# Patient Record
Sex: FEMALE | Race: WHITE | NOT HISPANIC OR LATINO | Employment: OTHER | URBAN - METROPOLITAN AREA
[De-identification: names, ages, dates, MRNs, and addresses within clinical notes are randomized per-mention and may not be internally consistent; named-entity substitution may affect disease eponyms.]

---

## 2022-01-01 ENCOUNTER — APPOINTMENT (OUTPATIENT)
Dept: RADIOLOGY | Facility: MEDICAL CENTER | Age: 72
End: 2022-01-01
Attending: EMERGENCY MEDICINE
Payer: MEDICARE

## 2022-01-01 ENCOUNTER — HOSPITAL ENCOUNTER (OUTPATIENT)
Facility: MEDICAL CENTER | Age: 72
End: 2022-07-25
Attending: EMERGENCY MEDICINE | Admitting: HOSPITALIST
Payer: MEDICARE

## 2022-01-01 VITALS
HEIGHT: 62 IN | TEMPERATURE: 97.3 F | OXYGEN SATURATION: 99 % | DIASTOLIC BLOOD PRESSURE: 67 MMHG | HEART RATE: 128 BPM | SYSTOLIC BLOOD PRESSURE: 148 MMHG | WEIGHT: 119.05 LBS | RESPIRATION RATE: 12 BRPM | BODY MASS INDEX: 21.91 KG/M2

## 2022-01-01 DIAGNOSIS — I10 HYPERTENSION, UNSPECIFIED TYPE: ICD-10-CM

## 2022-01-01 DIAGNOSIS — I62.9 INTRACRANIAL HEMORRHAGE (HCC): ICD-10-CM

## 2022-01-01 DIAGNOSIS — I63.9 ACUTE CVA (CEREBROVASCULAR ACCIDENT) (HCC): ICD-10-CM

## 2022-01-01 LAB
ABO + RH BLD: NORMAL
ABO GROUP BLD: NORMAL
ALBUMIN SERPL BCP-MCNC: 4.1 G/DL (ref 3.2–4.9)
ALBUMIN/GLOB SERPL: 1.5 G/DL
ALP SERPL-CCNC: 89 U/L (ref 30–99)
ALT SERPL-CCNC: 9 U/L (ref 2–50)
ANION GAP SERPL CALC-SCNC: 16 MMOL/L (ref 7–16)
APTT PPP: 28.6 SEC (ref 24.7–36)
AST SERPL-CCNC: 19 U/L (ref 12–45)
BASOPHILS # BLD AUTO: 0.6 % (ref 0–1.8)
BASOPHILS # BLD: 0.08 K/UL (ref 0–0.12)
BILIRUB SERPL-MCNC: 1.1 MG/DL (ref 0.1–1.5)
BLD GP AB SCN SERPL QL: NORMAL
BUN SERPL-MCNC: 16 MG/DL (ref 8–22)
CALCIUM SERPL-MCNC: 9.4 MG/DL (ref 8.5–10.5)
CHLORIDE SERPL-SCNC: 105 MMOL/L (ref 96–112)
CO2 SERPL-SCNC: 20 MMOL/L (ref 20–33)
CREAT SERPL-MCNC: 0.69 MG/DL (ref 0.5–1.4)
EKG IMPRESSION: NORMAL
EOSINOPHIL # BLD AUTO: 0.13 K/UL (ref 0–0.51)
EOSINOPHIL NFR BLD: 1 % (ref 0–6.9)
ERYTHROCYTE [DISTWIDTH] IN BLOOD BY AUTOMATED COUNT: 45.6 FL (ref 35.9–50)
GFR SERPLBLD CREATININE-BSD FMLA CKD-EPI: 91 ML/MIN/1.73 M 2
GLOBULIN SER CALC-MCNC: 2.8 G/DL (ref 1.9–3.5)
GLUCOSE SERPL-MCNC: 154 MG/DL (ref 65–99)
HCT VFR BLD AUTO: 48.8 % (ref 37–47)
HGB BLD-MCNC: 15.7 G/DL (ref 12–16)
IMM GRANULOCYTES # BLD AUTO: 0.05 K/UL (ref 0–0.11)
IMM GRANULOCYTES NFR BLD AUTO: 0.4 % (ref 0–0.9)
INR PPP: 1.04 (ref 0.87–1.13)
LYMPHOCYTES # BLD AUTO: 1.4 K/UL (ref 1–4.8)
LYMPHOCYTES NFR BLD: 10.6 % (ref 22–41)
MCH RBC QN AUTO: 29 PG (ref 27–33)
MCHC RBC AUTO-ENTMCNC: 32.2 G/DL (ref 33.6–35)
MCV RBC AUTO: 90.2 FL (ref 81.4–97.8)
MONOCYTES # BLD AUTO: 0.51 K/UL (ref 0–0.85)
MONOCYTES NFR BLD AUTO: 3.9 % (ref 0–13.4)
NEUTROPHILS # BLD AUTO: 11.07 K/UL (ref 2–7.15)
NEUTROPHILS NFR BLD: 83.5 % (ref 44–72)
NRBC # BLD AUTO: 0 K/UL
NRBC BLD-RTO: 0 /100 WBC
PLATELET # BLD AUTO: 273 K/UL (ref 164–446)
PMV BLD AUTO: 10.5 FL (ref 9–12.9)
POTASSIUM SERPL-SCNC: 3.9 MMOL/L (ref 3.6–5.5)
PROT SERPL-MCNC: 6.9 G/DL (ref 6–8.2)
PROTHROMBIN TIME: 13.5 SEC (ref 12–14.6)
RBC # BLD AUTO: 5.41 M/UL (ref 4.2–5.4)
RH BLD: NORMAL
SODIUM SERPL-SCNC: 141 MMOL/L (ref 135–145)
TROPONIN T SERPL-MCNC: 24 NG/L (ref 6–19)
WBC # BLD AUTO: 13.2 K/UL (ref 4.8–10.8)

## 2022-01-01 PROCEDURE — 85730 THROMBOPLASTIN TIME PARTIAL: CPT

## 2022-01-01 PROCEDURE — G0378 HOSPITAL OBSERVATION PER HR: HCPCS

## 2022-01-01 PROCEDURE — 80053 COMPREHEN METABOLIC PANEL: CPT

## 2022-01-01 PROCEDURE — 700111 HCHG RX REV CODE 636 W/ 250 OVERRIDE (IP): Performed by: HOSPITALIST

## 2022-01-01 PROCEDURE — 96376 TX/PRO/DX INJ SAME DRUG ADON: CPT

## 2022-01-01 PROCEDURE — 85025 COMPLETE CBC W/AUTO DIFF WBC: CPT

## 2022-01-01 PROCEDURE — 99225 PR SUBSEQUENT OBSERVATION CARE,LEVEL II: CPT | Performed by: STUDENT IN AN ORGANIZED HEALTH CARE EDUCATION/TRAINING PROGRAM

## 2022-01-01 PROCEDURE — 86900 BLOOD TYPING SEROLOGIC ABO: CPT

## 2022-01-01 PROCEDURE — 93005 ELECTROCARDIOGRAM TRACING: CPT | Performed by: EMERGENCY MEDICINE

## 2022-01-01 PROCEDURE — 99219 PR INITIAL OBSERVATION CARE,LEVL II: CPT | Performed by: HOSPITALIST

## 2022-01-01 PROCEDURE — 96375 TX/PRO/DX INJ NEW DRUG ADDON: CPT

## 2022-01-01 PROCEDURE — 96376 TX/PRO/DX INJ SAME DRUG ADON: CPT | Mod: XU

## 2022-01-01 PROCEDURE — 86850 RBC ANTIBODY SCREEN: CPT

## 2022-01-01 PROCEDURE — 700117 HCHG RX CONTRAST REV CODE 255: Performed by: EMERGENCY MEDICINE

## 2022-01-01 PROCEDURE — 700111 HCHG RX REV CODE 636 W/ 250 OVERRIDE (IP)

## 2022-01-01 PROCEDURE — 99224 PR SUBSEQUENT OBSERVATION CARE,LEVEL I: CPT | Performed by: STUDENT IN AN ORGANIZED HEALTH CARE EDUCATION/TRAINING PROGRAM

## 2022-01-01 PROCEDURE — 94760 N-INVAS EAR/PLS OXIMETRY 1: CPT

## 2022-01-01 PROCEDURE — A9270 NON-COVERED ITEM OR SERVICE: HCPCS | Performed by: EMERGENCY MEDICINE

## 2022-01-01 PROCEDURE — 86901 BLOOD TYPING SEROLOGIC RH(D): CPT

## 2022-01-01 PROCEDURE — 700102 HCHG RX REV CODE 250 W/ 637 OVERRIDE(OP): Performed by: EMERGENCY MEDICINE

## 2022-01-01 PROCEDURE — 70450 CT HEAD/BRAIN W/O DYE: CPT | Mod: MG

## 2022-01-01 PROCEDURE — 70498 CT ANGIOGRAPHY NECK: CPT | Mod: MG

## 2022-01-01 PROCEDURE — 96374 THER/PROPH/DIAG INJ IV PUSH: CPT | Mod: XU

## 2022-01-01 PROCEDURE — 84484 ASSAY OF TROPONIN QUANT: CPT

## 2022-01-01 PROCEDURE — 36415 COLL VENOUS BLD VENIPUNCTURE: CPT

## 2022-01-01 PROCEDURE — 99285 EMERGENCY DEPT VISIT HI MDM: CPT

## 2022-01-01 PROCEDURE — 85610 PROTHROMBIN TIME: CPT

## 2022-01-01 PROCEDURE — 700111 HCHG RX REV CODE 636 W/ 250 OVERRIDE (IP): Performed by: STUDENT IN AN ORGANIZED HEALTH CARE EDUCATION/TRAINING PROGRAM

## 2022-01-01 PROCEDURE — 96375 TX/PRO/DX INJ NEW DRUG ADDON: CPT | Mod: XU

## 2022-01-01 PROCEDURE — 70496 CT ANGIOGRAPHY HEAD: CPT | Mod: MG

## 2022-01-01 RX ORDER — ATROPINE SULFATE 10 MG/ML
2 SOLUTION/ DROPS OPHTHALMIC EVERY 4 HOURS PRN
Status: DISCONTINUED | OUTPATIENT
Start: 2022-01-01 | End: 2022-01-01 | Stop reason: HOSPADM

## 2022-01-01 RX ORDER — HYDRALAZINE HYDROCHLORIDE 20 MG/ML
10 INJECTION INTRAMUSCULAR; INTRAVENOUS
Status: DISCONTINUED | OUTPATIENT
Start: 2022-01-01 | End: 2022-01-01

## 2022-01-01 RX ORDER — DOCUSATE SODIUM 100 MG/1
100 CAPSULE, LIQUID FILLED ORAL EVERY 12 HOURS
Status: DISCONTINUED | OUTPATIENT
Start: 2022-01-01 | End: 2022-01-01 | Stop reason: HOSPADM

## 2022-01-01 RX ORDER — CLONIDINE 0.2 MG/24H
1 PATCH, EXTENDED RELEASE TRANSDERMAL ONCE
Status: DISCONTINUED | OUTPATIENT
Start: 2022-01-01 | End: 2022-01-01 | Stop reason: HOSPADM

## 2022-01-01 RX ORDER — DOCUSATE SODIUM 100 MG/1
100 CAPSULE, LIQUID FILLED ORAL 2 TIMES DAILY
COMMUNITY

## 2022-01-01 RX ORDER — POLYVINYL ALCOHOL 14 MG/ML
2 SOLUTION/ DROPS OPHTHALMIC EVERY 6 HOURS PRN
Status: DISCONTINUED | OUTPATIENT
Start: 2022-01-01 | End: 2022-01-01 | Stop reason: HOSPADM

## 2022-01-01 RX ORDER — LORAZEPAM 2 MG/ML
2 INJECTION INTRAMUSCULAR
Status: DISCONTINUED | OUTPATIENT
Start: 2022-01-01 | End: 2022-01-01

## 2022-01-01 RX ORDER — LATANOPROST 50 UG/ML
1 SOLUTION/ DROPS OPHTHALMIC NIGHTLY
COMMUNITY

## 2022-01-01 RX ORDER — ONDANSETRON 2 MG/ML
4 INJECTION INTRAMUSCULAR; INTRAVENOUS EVERY 4 HOURS PRN
Status: DISCONTINUED | OUTPATIENT
Start: 2022-01-01 | End: 2022-01-01 | Stop reason: HOSPADM

## 2022-01-01 RX ORDER — MORPHINE SULFATE 4 MG/ML
INJECTION INTRAVENOUS
Status: COMPLETED
Start: 2022-01-01 | End: 2022-01-01

## 2022-01-01 RX ORDER — DULOXETIN HYDROCHLORIDE 30 MG/1
30 CAPSULE, DELAYED RELEASE ORAL EVERY MORNING
COMMUNITY

## 2022-01-01 RX ORDER — LIDOCAINE 50 MG/G
1 PATCH TOPICAL EVERY 24 HOURS
COMMUNITY

## 2022-01-01 RX ORDER — HALOPERIDOL 2 MG/ML
1 SOLUTION ORAL EVERY 6 HOURS PRN
Status: DISCONTINUED | OUTPATIENT
Start: 2022-01-01 | End: 2022-01-01

## 2022-01-01 RX ORDER — ASPIRIN 325 MG
325 TABLET ORAL EVERY MORNING
COMMUNITY

## 2022-01-01 RX ORDER — BISACODYL 10 MG
10 SUPPOSITORY, RECTAL RECTAL
COMMUNITY

## 2022-01-01 RX ORDER — LEVOTHYROXINE SODIUM 0.07 MG/1
75 TABLET ORAL EVERY MORNING
COMMUNITY

## 2022-01-01 RX ORDER — DIAZEPAM 5 MG/ML
5 INJECTION, SOLUTION INTRAMUSCULAR; INTRAVENOUS
Status: DISCONTINUED | OUTPATIENT
Start: 2022-01-01 | End: 2022-01-01 | Stop reason: HOSPADM

## 2022-01-01 RX ORDER — MORPHINE SULFATE 4 MG/ML
2 INJECTION INTRAVENOUS ONCE
Status: COMPLETED | OUTPATIENT
Start: 2022-01-01 | End: 2022-01-01

## 2022-01-01 RX ORDER — IBUPROFEN 200 MG
400 TABLET ORAL EVERY 4 HOURS PRN
COMMUNITY

## 2022-01-01 RX ORDER — HALOPERIDOL 5 MG/ML
1 INJECTION INTRAMUSCULAR EVERY 6 HOURS PRN
Status: DISCONTINUED | OUTPATIENT
Start: 2022-01-01 | End: 2022-01-01

## 2022-01-01 RX ADMIN — MORPHINE SULFATE 5 MG: 10 INJECTION INTRAVENOUS at 02:48

## 2022-01-01 RX ADMIN — MORPHINE SULFATE 10 MG: 10 INJECTION INTRAVENOUS at 17:49

## 2022-01-01 RX ADMIN — MORPHINE SULFATE 5 MG: 10 INJECTION INTRAVENOUS at 23:08

## 2022-01-01 RX ADMIN — MORPHINE SULFATE 2 MG: 4 INJECTION INTRAVENOUS at 11:19

## 2022-01-01 RX ADMIN — MORPHINE SULFATE 10 MG: 10 INJECTION INTRAVENOUS at 06:44

## 2022-01-01 RX ADMIN — MORPHINE SULFATE 5 MG: 10 INJECTION INTRAVENOUS at 07:36

## 2022-01-01 RX ADMIN — MORPHINE SULFATE 5 MG: 10 INJECTION INTRAVENOUS at 09:45

## 2022-01-01 RX ADMIN — MORPHINE SULFATE 5 MG: 10 INJECTION INTRAVENOUS at 18:40

## 2022-01-01 RX ADMIN — CLONIDINE 1 PATCH: 0.2 PATCH TRANSDERMAL at 10:16

## 2022-01-01 RX ADMIN — MORPHINE SULFATE 10 MG: 10 INJECTION INTRAVENOUS at 21:17

## 2022-01-01 RX ADMIN — MORPHINE SULFATE 5 MG: 10 INJECTION INTRAVENOUS at 07:43

## 2022-01-01 RX ADMIN — MORPHINE SULFATE 5 MG: 10 INJECTION INTRAVENOUS at 22:53

## 2022-01-01 RX ADMIN — MORPHINE SULFATE 10 MG: 10 INJECTION INTRAVENOUS at 06:03

## 2022-01-01 RX ADMIN — MORPHINE SULFATE 10 MG: 10 INJECTION INTRAVENOUS at 14:24

## 2022-01-01 RX ADMIN — ONDANSETRON HYDROCHLORIDE 4 MG: 2 INJECTION, SOLUTION INTRAMUSCULAR; INTRAVENOUS at 12:44

## 2022-01-01 RX ADMIN — MORPHINE SULFATE 10 MG: 10 INJECTION INTRAVENOUS at 19:46

## 2022-01-01 RX ADMIN — MORPHINE SULFATE 5 MG: 10 INJECTION INTRAVENOUS at 01:20

## 2022-01-01 RX ADMIN — DIAZEPAM 5 MG: 5 INJECTION, SOLUTION INTRAMUSCULAR; INTRAVENOUS at 06:32

## 2022-01-01 RX ADMIN — IOHEXOL 80 ML: 350 INJECTION, SOLUTION INTRAVENOUS at 09:51

## 2022-01-01 RX ADMIN — MORPHINE SULFATE 10 MG: 10 INJECTION INTRAVENOUS at 15:18

## 2022-01-01 RX ADMIN — MORPHINE SULFATE 10 MG: 10 INJECTION INTRAVENOUS at 05:52

## 2022-01-01 RX ADMIN — MORPHINE SULFATE 10 MG: 10 INJECTION INTRAVENOUS at 12:22

## 2022-01-01 RX ADMIN — HALOPERIDOL LACTATE 1 MG: 5 INJECTION, SOLUTION INTRAMUSCULAR at 11:49

## 2022-01-01 RX ADMIN — MORPHINE SULFATE 10 MG: 10 INJECTION INTRAVENOUS at 03:13

## 2022-01-01 RX ADMIN — MORPHINE SULFATE 5 MG: 10 INJECTION INTRAVENOUS at 04:19

## 2022-01-01 RX ADMIN — MORPHINE SULFATE 10 MG: 10 INJECTION INTRAVENOUS at 14:26

## 2022-01-01 RX ADMIN — MORPHINE SULFATE 5 MG: 10 INJECTION INTRAVENOUS at 12:44

## 2022-01-01 ASSESSMENT — PATIENT HEALTH QUESTIONNAIRE - PHQ9
SUM OF ALL RESPONSES TO PHQ9 QUESTIONS 1 AND 2: 0
2. FEELING DOWN, DEPRESSED, IRRITABLE, OR HOPELESS: NOT AT ALL
1. LITTLE INTEREST OR PLEASURE IN DOING THINGS: NOT AT ALL

## 2022-01-01 ASSESSMENT — FIBROSIS 4 INDEX: FIB4 SCORE: 1.72

## 2022-07-20 PROBLEM — I62.9 INTRACRANIAL HEMORRHAGE (HCC): Status: ACTIVE | Noted: 2022-01-01

## 2022-07-20 PROBLEM — I16.1 HYPERTENSIVE EMERGENCY: Status: ACTIVE | Noted: 2022-01-01

## 2022-07-20 NOTE — ED NOTES
sats 72-90% on room air intermittently, irregular slow breaths. No grimacing or nonverbal signs of distress.

## 2022-07-20 NOTE — ED NOTES
Report received from Janay SHARMA.  PT is non-verbal, GCS=10, refusing to open eyes.  PEr report pt has vivid h/o CVA since 2018.  Notable contractions to B LES and L UE.    PEr report CT scan showed new ICH.  PT remaisn hypertensive -200.    Catapress patch found on pt's back.  Removed and new order received from ERP.      ERP able to discuss plan of care and CT scan finding with pt's decision maker, and VO for comfort measures given.      CXRay canceled per VO.

## 2022-07-20 NOTE — H&P
Hospital Medicine History & Physical Note    Date of Service  7/20/2022    Primary Care Physician  Roberto Dempsey M.D. (Inactive)    Consultants  neurology        Code Status  Comfort Care/DNR    Chief Complaint  Chief Complaint   Patient presents with   • Possible Stroke     Pt presets from long term care facility after staff reported pt has a new unilateral facial droop. Pt withdrawn as well. Pt has a history of CVA, is non-verbal and with left sided deficits at baseline but is usually more alert per staff. Pt is a hayden of the [facility], unable to contact at this time, pt is also a DNR/DNI. Stroke protocol initiated. FSBG 154.       History of Presenting Illness  Bee Sharma is a 72 y.o. female reported to the [facility] with past medical history of CVA and severely disabled who presented 7/20/2022 with altered mental status.  She was noted to be hypertensive with SBP up to 240s on presentation.  CT head revealed pontine/midbrain/basal ganglia hemorrhage with associated intraventricular hemorrhage.  ERP discussed with patient's legal guardian and she was transitioned to comfort care.    I discussed the plan of care with bedside RN and ERP.    Review of Systems  Review of Systems   Unable to perform ROS: Patient unresponsive       Past Medical History   has a past medical history of Aphasia, Elevated cholesterol, Hypertension, Psychiatric disorder, Skull fracture (HCC), and Stroke (HCC).    Surgical History   has a past surgical history that includes other neurological surg; peg placement (9/12/08); and hip cannulated screw (4/13/2010).     Family History  family history is not on file.   Family history reviewed with patient. There is no family history that is pertinent to the chief complaint.     Social History   reports that she has never smoked. She does not have any smokeless tobacco history on file. She reports that she does not drink alcohol and does not use drugs.    Allergies  Allergies   Allergen Reactions   •  Cyclobenzaprine Unspecified     On MAR from facility     • Hydrocodone Unspecified     On MAR from facility     • Penicillins Unspecified     Tolerated Ceftriaxone in 2010  On MAR from facility     • Sulfa Drugs Unspecified     On MAR from facility     • Tramadol Unspecified     On MAR from facility         Medications  Prior to Admission Medications   Prescriptions Last Dose Informant Patient Reported? Taking?   DULoxetine (CYMBALTA) 30 MG Cap DR Particles 7/19/2022 at 0800 MAR from Other Facility Yes Yes   Sig: Take 30 mg by mouth every morning.   aspirin (ASA) 325 MG Tab 7/19/2022 at 0800 MAR from Other Facility Yes Yes   Sig: Take 325 mg by mouth every morning.   bisacodyl (DULCOLAX) 10 MG Suppos 7/17/2022 at 0148 MAR from Other Facility Yes Yes   Sig: Insert 10 mg into the rectum 1 time a day as needed. Indications: Constipation   clonidine (CATAPRES) 0.2 MG/24HR PATCH WEEKLY 7/14/2022 at 800 MAR from Other Facility Yes No   Sig: Apply 1 Patch to skin as directed every 7 days. Changed ever Wednesday   docusate sodium (COLACE) 100 MG Cap 7/19/2022 at 2100 MAR from Other Facility Yes Yes   Sig: Take 100 mg by mouth 2 times a day.   ibuprofen (MOTRIN) 200 MG Tab 7/18/2022 at 0846 MAR from Other Facility Yes Yes   Sig: Take 400 mg by mouth every four hours as needed.   latanoprost (XALATAN) 0.005 % Solution 7/19/2022 at 2100 MAR from Other Facility Yes Yes   Sig: Administer 1 Drop into both eyes every evening.   levothyroxine (SYNTHROID) 75 MCG Tab 7/20/2022 at 0500 MAR from Other Facility Yes Yes   Sig: Take 75 mcg by mouth every morning.   lidocaine (LIDODERM) 5 % Patch 7/20/2022 at 0500 MAR from Other Facility Yes Yes   Sig: Place 1 Patch on the skin every 24 hours.   magnesium hydroxide (MILK OF MAGNESIA) 400 MG/5ML Suspension 7/16/2022 at 1653 MAR from Other Facility Yes Yes   Sig: Take 30 mL by mouth 1 time a day as needed (constipation).   polyethylene glycol/lytes (MIRALAX) Pack 7/20/2022 at 0800 MAR from  Other Facility Yes No   Sig: Take 17 g by mouth every day. Indications: Constipation   simvastatin (ZOCOR) 40 MG Tab 7/19/2022 at 2100 MAR from Other Facility No No   Sig: Take 1 Tab by mouth every evening.   therapeutic multivitamin-minerals (THERAGRAN-M) Tab 7/19/2022 at 0800 MAR from Other Facility Yes No   Sig: Take 1 Tab by mouth every day.      Facility-Administered Medications: None       Physical Exam  Temp:  [36.9 °C (98.4 °F)] 36.9 °C (98.4 °F)  Pulse:  [] 108  Resp:  [10-28] 10  BP: (161-240)/() 161/71  SpO2:  [80 %-97 %] 80 %  Blood Pressure : (!) 161/71   Temperature: 36.9 °C (98.4 °F)   Pulse: (!) 108   Respiration: (!) 8   Pulse Oximetry: (!) 80 %       Physical Exam  Vitals and nursing note reviewed.   Constitutional:       Comments: obtunded   Cardiovascular:      Rate and Rhythm: Normal rate and regular rhythm.   Musculoskeletal:      Comments: Contractures of extremities   Neurological:      Comments: unresponsive         Laboratory:  Recent Labs     07/20/22  0925   WBC 13.2*   RBC 5.41*   HEMOGLOBIN 15.7   HEMATOCRIT 48.8*   MCV 90.2   MCH 29.0   MCHC 32.2*   RDW 45.6   PLATELETCT 273   MPV 10.5     Recent Labs     07/20/22  0925   SODIUM 141   POTASSIUM 3.9   CHLORIDE 105   CO2 20   GLUCOSE 154*   BUN 16   CREATININE 0.69   CALCIUM 9.4     Recent Labs     07/20/22  0925   ALTSGPT 9   ASTSGOT 19   ALKPHOSPHAT 89   TBILIRUBIN 1.1   GLUCOSE 154*     Recent Labs     07/20/22  0925   APTT 28.6   INR 1.04     No results for input(s): NTPROBNP in the last 72 hours.      Recent Labs     07/20/22  0925   TROPONINT 24*       Imaging:  CT-CTA NECK WITH & W/O-POST PROCESSING   Final Result      1.  At the level of the left carotid bifurcation there is an 11 x 8 mm area of abnormal contrast opacification medial to the internal carotid artery which may represent a pseudoaneurysm. Carotid duplex ultrasound could be performed for further    evaluation.   2.  Atherosclerosis.      Findings were  discussed with Dr. Alford on 7/20/2022 at 1136 hours.      CT-CTA HEAD WITH & W/O-POST PROCESS   Final Result      CT angiogram of the [place] of [place] within normal limits.      CT-HEAD W/O   Final Result      1.  2.5 x 3.0 cm right basal ganglia and parenchymal hematoma which extends into the ventricular system resulting in a small amount of dependent hemorrhage in the left lateral ventricle and within the third ventricle.   2.  Minimal right to left midline shift measuring 2 mm.   3.  Advanced diffuse cerebral substance loss.   4.  Advanced microangiopathic ischemic change.      Findings were discussed with Dr. Alford on 7/20/2022 0945 hours.          EKG:  I have personally reviewed the images and compared with prior images.    Assessment/Plan:  Justification for Admission Status  I anticipate this patient is appropriate for observation status at this time because as she is comfort care on admit and may pass within the next 48 hours    Patient will need a Med/Surg bed on MEDICAL service .  The need is secondary to requirement of comfort care measure medication administration.    * Intracranial hemorrhage (HCC)- (present on admission)  Assessment & Plan  Noted on CT imaging  Pt made comfort care    Hypertensive emergency  Assessment & Plan  With resultant ICH  Now on comfort care      VTE prophylaxis:  none- comfort care

## 2022-07-20 NOTE — ED NOTES
Pharmacy Medication Reconciliation    ~Med rec updated and complete per MAR-[facility]  ~Allergies have been verified and updated per MAR  ~No oral ABX within the last 30 days

## 2022-07-20 NOTE — ED PROVIDER NOTES
ED Provider Note    Scribed for Huseyin Alford M.D. by Moraima Wren. 7/20/2022,  9:27 AM.    Means of Arrival: Med Flight  History obtained from: EMS  History limited by: Patient is obtunded    CHIEF COMPLAINT  Chief Complaint   Patient presents with   • Possible Stroke     Pt presets from [facility] after staff reported pt has a new unilateral facial droop. Pt withdrawn as well. Pt has a history of CVA, is non-verbal and with left sided deficits at baseline but is usually more alert per staff. Pt is a hayden of the [facility], unable to contact at this time, pt is also a DNR/DNI. Stroke protocol initiated. FSBG 154.     HPI  Bee Sharma is a 72 y.o. female who presents to the Emergency Department via Med Flight from [facility] for evaluation of possible stroke onset prior to arrival. Per EMS report, she was noted by caregivers to have new left sided facial droop. EMS report she has complex history of multiple previous strokes and is nonverbal, GCS 11 at baseline, but typically more alert per staff. Per triage note, patient is a hayden of the [facility] with DNR/DNI in place. EMS note history of bilateral facial droop, bilateral contractures, dysphagia, hypertension, and hyperlipidemia. EMS report she takes daily aspirin, but does not take any other blood thinners. No alleviating factors were reported.      History limited by: Patient is obtunded    REVIEW OF SYSTEMS  CONSTITUTIONAL:  No fever. Obtunded.  NEUROLOGIC:  Nonverbal, GCS 11 at baseline. Positive left sided facial droop.  See HPI for further details.     ROS limited by: Patient is obtunded    PAST MEDICAL HISTORY  Past Medical History:   Diagnosis Date   • Aphasia    • Elevated cholesterol    • Hypertension    • Psychiatric disorder     depression   • Skull fracture (HCC)    • Stroke (HCC)      FAMILY HISTORY  None reported.    SOCIAL HISTORY   reports that she has never smoked. She does not have any smokeless tobacco history  on file. She reports that she does not drink alcohol and does not use drugs.    SURGICAL HISTORY  Past Surgical History:   Procedure Laterality Date   • HIP CANNULATED SCREW  4/13/2010    Performed by BRAYAN BURTON at SURGERY [facility] ORS   • PEG PLACEMENT  9/12/08    Performed by ORIANA MCCOY at ENDOSCOPY [facility] ORS   • OTHER NEUROLOGICAL SURG      left sided CVA     CURRENT MEDICATIONS  Current Outpatient Medications   Medication Instructions   • acetaminophen (TYLENOL) 650 mg, Oral, EVERY 6 HOURS PRN   • acetaminophen-codeine #3 (TYLENOL #3) 300-30 MG Tab 1 Tablet, Oral, EVERY 6 HOURS PRN   • amLODIPine (NORVASC) 10 mg, Oral, DAILY   • aspirin (ASA) 325 mg, Oral, DAILY   • clonidine (CATAPRES) 0.2 MG/24HR PATCH WEEKLY 1 Patch, Transdermal, EVERY 7 DAYS, Changed ever Wednesday    • heparin 5,000 Units, Subcutaneous, EVERY 8 HOURS   • hydrALAZINE (APRESOLINE) 25 mg, Oral, DAILY   • LORazepam (ATIVAN) 0.5 mg, Oral, EVERY 4 HOURS PRN   • NON SPECIFIED 1 Drop, Both Eyes, EVERY BEDTIME, Latanoprost Solution 0.005%<BR>Instill 1 drop in both eyes at bedtime related to Unspecified Glaucoma    • nystatin (MYCOSTATIN) Powder 1 Film, Topical, 3 TIMES DAILY   • oxyCODONE immediate-release (ROXICODONE) 5 mg, Oral, EVERY 3 HOURS PRN   • polyethylene glycol/lytes (MIRALAX) 17 g, Oral, DAILY   • sertraline (ZOLOFT) 25 mg, Oral, DAILY   • simvastatin (ZOCOR) 40 mg, Oral, EVERY EVENING   • spironolactone (ALDACTONE) 25 mg, Oral, DAILY   • therapeutic multivitamin-minerals (THERAGRAN-M) Tab 1 Tablet, Oral, DAILY     ALLERGIES  Allergies   Allergen Reactions   • Cyclobenzaprine Unspecified     On MAR from facility     • Hydrocodone Unspecified     On MAR from facility     • Penicillins Unspecified     Tolerated Ceftriaxone in 2010  On MAR from facility     • Sulfa Drugs Unspecified     On MAR from facility     • Tramadol Unspecified     On MAR from facility         PHYSICAL EXAM  VITAL SIGNS: Temp 36.9 °C (98.4 °F)  "(Temporal)   Ht 1.575 m (5' 2\")   Wt 51.7 kg (114 lb)   BMI 20.85 kg/m²    Gen: Obtunded.   HEENT: ATNC  Eyes: Right pupil 6 mm, left 4 mm, normal conjunctiva.   Neck: trachea midline  Resp: course upper breath sounds, snoring respiration  CV: Tachycardic. No JVD  Abd: non-distended, soft  Ext: No deformities  Psych: unable to assess  Neuro: baseline nonverbal. NIH score 29.    PE limited by: Patient is obtunded    DIAGNOSTIC STUDIES / PROCEDURES     EKG  Results for orders placed or performed during the hospital encounter of 22   EKG (NOW)   Result Value Ref Range    Report       [facility] Emergency Dept.    Test Date:  2022  Pt Name:    TAVO ZAMORANO                  Department: ER  MRN:        [id number]                      Room:        10  Gender:     Female                       Technician: [id number]  :        [id number]                   Requested By:MICHEL ALFORD  Order #:    [id number]                    Reading MD: Michel Alford    Measurements  Intervals                                Axis  Rate:       135                          P:          0  MA:         79                           QRS:        115  QRSD:       94                           T:          -69  QT:         304  QTc:        456    Interpretive Statements  SINUS TACHYCARDIA  RIGHT AXIS DEVIATION  PROBABLE INFERIOR INFARCT, AGE INDETERMINATE  ST DEPRESSION, CONSIDER ISCHEMIA, ANT-LAT LDS  ARTIFACT IN LEAD(S) I,II,III,aVR,aVL,V2,V3,V4,V5,V6 AND BASELINE WANDER IN  LEAD(S) I,II,III,aVL,aVF,V4  Compared to ECG 10/04/2008 12:29:47  Right-axis deviation now present  Myocardial  infarct finding now present  ST (T wave) deviation now present  Sinus rhythm no longer present  Electronically Signed On 2022 9:53:38 PDT by Michel Alford        LABS  Labs Reviewed   CBC WITH DIFFERENTIAL - Abnormal; Notable for the following components:       Result Value    WBC 13.2 (*)     RBC 5.41 (*)     Hematocrit 48.8 (*)     MCHC 32.2 " (*)     Neutrophils-Polys 83.50 (*)     Lymphocytes 10.60 (*)     Neutrophils (Absolute) 11.07 (*)     All other components within normal limits    Narrative:     Indicate which anticoagulants the patient is on:->UNKNOWN   COMP METABOLIC PANEL - Abnormal; Notable for the following components:    Glucose 154 (*)     All other components within normal limits    Narrative:     Indicate which anticoagulants the patient is on:->UNKNOWN   TROPONIN - Abnormal; Notable for the following components:    Troponin T 24 (*)     All other components within normal limits    Narrative:     Indicate which anticoagulants the patient is on:->UNKNOWN   PROTHROMBIN TIME    Narrative:     Indicate which anticoagulants the patient is on:->UNKNOWN   APTT    Narrative:     Indicate which anticoagulants the patient is on:->UNKNOWN   COD (ADULT)   ESTIMATED GFR    Narrative:     Indicate which anticoagulants the patient is on:->UNKNOWN   ABO RH CONFIRM     All labs reviewed by me.    RADIOLOGY  CT-CTA HEAD WITH & W/O-POST PROCESS   Final Result      CT angiogram of the [place] of [place] within normal limits.      CT-HEAD W/O   Final Result      1.  2.5 x 3.0 cm right basal ganglia and parenchymal hematoma which extends into the ventricular system resulting in a small amount of dependent hemorrhage in the left lateral ventricle and within the third ventricle.   2.  Minimal right to left midline shift measuring 2 mm.   3.  Advanced diffuse cerebral substance loss.   4.  Advanced microangiopathic ischemic change.      Findings were discussed with Dr. Alford on 7/20/2022 0945 hours.      CT-CTA NECK WITH & W/O-POST PROCESSING    (Results Pending)     The radiologist’s interpretation of all radiology studies have been reviewed by me.    COURSE & MEDICAL DECISION MAKING  Pertinent Labs & Imaging studies reviewed. (See chart for details)    9:27 AM Patient seen and examined at charge desk. Ordered for EKG, labs and imaging to evaluate.  Neurology  assessed patient at charge desk. Patient is a hayden of the state, but unable to contact at this time.    9:47 AM I discussed the patient's case and the above findings with Radiology who inform me the patient has a hematoma into ventricles.    9:56 AM I discussed the patient's case and the above findings with Sasha ([place] Guardian) who agrees to comfort care.    10:02 AM - Paged Hospitalist.     10:32 AM I discussed the patient's case and the above findings with Dr. Garner (Hospitalist) who will assess the patient for hospitalization.     NIH score: 29    CRITICAL CARE  The very real possibilty of a deterioration of this patient's condition required the highest level of my preparedness for sudden, emergent intervention.  I provided critical care services, which included medication orders, frequent reevaluations of the patient's condition and response to treatment, ordering and reviewing test results, and discussing the case with various consultants.  The critical care time associated with the care of the patient was 35 minutes. Review chart for interventions. This time is exclusive of any other billable procedures.       Medical Decision Making:  Patient arrives from a nursing facility with altered mental status.  At presentation, he has asymmetric pupils, appears to be somewhat posturing, possible seizure versus CVA.  Reportedly hypotensive by EMS.  Code stroke was activated and the patient went emergently for CAT scan.  Neurology PA at bedside for initial evaluation.  Patient is outside of the window for tPA.    CAT scan demonstrates pontine bleed with some extension into the ventricles.  Patient initially ordered for blood pressure management with nicardipine drip.  No blood thinners for reversal.  She had an old clonidine patch which was due for replacement, will replace this to prevent rebound hypertension.     I was able to get a hold of the power of , the deputy guardian, and after discussion with the  case, including various treatment options, we all agreed that comfort measures is in the best interest of the patient and in keeping with her prior wishes.  Patient will be admitted for comfort measures.      I wore a mask and eye protection throughout the encounter    DISPOSITION:  Patient will be hospitalized by Dr. Garner in critical condition.      FINAL IMPRESSION  1. Acute CVA (cerebrovascular accident) (HCC)    2. Hypertension, unspecified type    3.      The critical care time associated with the care of the patient was 35 minutes.      Moraima DRIVER (Scribceli), am scribing for, and in the presence of, Huseyin Alford M.D..    Electronically signed by: Moraima Wren (Baileyibceli), 7/20/2022    Huseyin DRIVER M.D. personally performed the services described in this documentation, as scribed by Moraima Wren in my presence, and it is both accurate and complete.    The note accurately reflects work and decisions made by me.  Huseyin Alford M.D.  7/20/2022  10:36 AM      This dictation was created using voice recognition software. The accuracy of the dictation is limited to the abilities of the software. I expect there may be some errors of grammar and possibly content. The nursing notes were reviewed and certain aspects of this information were incorporated into this note.

## 2022-07-20 NOTE — ED TRIAGE NOTES
"Chief Complaint   Patient presents with   • Possible Stroke     Pt presets from long term care facility after staff reported pt has a new unilateral facial droop. Pt withdrawn as well. Pt has a history of CVA, is non-verbal and with left sided deficits at baseline but is usually more alert per staff. Pt is a hayden of the state, unable to contact at this time, pt is also a DNR/DNI. Stroke protocol initiated. FSBG 154.     Temp 36.9 °C (98.4 °F) (Temporal)   Ht 1.575 m (5' 2\")   Wt 51.7 kg (114 lb)   BMI 20.85 kg/m²         "

## 2022-07-20 NOTE — PROGRESS NOTES
Neurology Note-- Stroke Alert    72-year old female with PMhx significant for stroke; severely disabled at baseline (GCS 11; aphasia, contractures x 4; is also reportedly a hayden of the state) and resides in a nursing home, also with hypothyroid and dyslipidemia (further details are limited) who presented to [facility] on 7/20/22 for a chief complaint of altered mentation. Nursing home staff last saw the patient in her usual state of health last night sometime/time is unknown. This morning, they found the patient obtunded, non interactive, question of facial weakness. EMS called and patient brought to [facility]. Here, SBP up to 240s. Stat CT head revealed pontine/midbrain/basal ganglia hemorrhage with associated IVH; likely hypertensive etiology. Her legal guardian was contacted from the ED and informed of the situation, and the decision was made to pursue comfort care measures only.     Neurology will thus sign off; please call with further questions/concerns.     CHETAN Sullivan.

## 2022-07-20 NOTE — ED NOTES
Report received from Rosalina SHARMA & ZACHARY Mcgovern. Pt has eyes closed, resps even & unlabored. Right pupil fixed & dilated, Left fixed & 3mm. No grimacing noted, but pt grunting. Morphine given.

## 2022-07-20 NOTE — ED NOTES
Report given to receiving RN, Pt is not tele, & floor RN unable to transport pt at this time. Placed on transport list.

## 2022-07-20 NOTE — ED NOTES
Alerted Hospitalist Patsy of signs of discomfort for pt, including hypertension and tachycardia and grimacing.  VO for pain medication received.

## 2022-07-21 NOTE — CARE PLAN
The patient is Unstable - High likelihood or risk of patient condition declining or worsening    Shift Goals  Clinical Goals: comfort and rest  Patient Goals: comfort and rest    Progress made toward(s) clinical / shift goals:      Problem: Pain - Standard  Goal: Alleviation of pain or a reduction in pain to the patient’s comfort goal  Outcome: Progressing     Problem: Fall Risk  Goal: Patient will remain free from falls  Outcome: Progressing    Patient is not progressing towards the following goals:    Problem: Knowledge Deficit - Standard  Goal: Patient and family/care givers will demonstrate understanding of plan of care, disease process/condition, diagnostic tests and medications  Outcome: Not Progressing

## 2022-07-21 NOTE — CARE PLAN
Problem: Pain - Standard  Goal: Alleviation of pain or a reduction in pain to the patient’s comfort goal  Outcome: Progressing     Problem: Skin Integrity  Goal: Skin integrity is maintained or improved  Outcome: Progressing     Problem: Fall Risk  Goal: Patient will remain free from falls  Outcome: Progressing     The patient is Unstable - High likelihood or risk of patient condition declining or worsening    Shift Goals  Clinical Goals: comfort rest  Patient Goals: comfort, rest    Progress made toward(s) clinical / shift goals:  patient is comfort care     Patient is not progressing towards the following goals:

## 2022-07-21 NOTE — PROGRESS NOTES
Assumed care from Scar VILLAGOMEZ  Assessment complete. Patient is laying in bed with bed alarm on. Patient is breathing slowly with around 2-4 breaths per minute. Comfort measures in place. Will continue to do hourly monitoring.  Patient resting comfortably in bed. Oral care preformed with mouth swabs, chap stick, and a warm wash cloth to the face. Patient responds to stimuli. O2 saturation in the 70's and breaths are around 5 breaths per minute.

## 2022-07-21 NOTE — PROGRESS NOTES
4 Eyes Skin Assessment Completed by ZACHARY Abbott and ZACHARY Parnell.    Head WDL  Ears WDL  Nose WDL  Mouth WDL  Neck WDL  Breast/Chest WDL  Shoulder Blades WDL  Spine WDL  (R) Arm/Elbow/Hand Bruising and Swelling  (L) Arm/Elbow/Hand Bruising  Abdomen WDL  Groin WDL  Scrotum/Coccyx/Buttocks WDL  (R) Leg WDL  (L) Leg WDL  (R) Heel/Foot/Toe Bruising and DTI to heel  (L) Heel/Foot/Toe WDL          Devices In Places Pulse Ox      Interventions In Place Pillows, Q2 Turns and Pressure Redistribution Mattress    Possible Skin Injury No    Pictures Uploaded Into Epic Yes  Wound Consult Placed N/A  RN Wound Prevention Protocol Ordered No

## 2022-07-21 NOTE — PROGRESS NOTES
Hospital Medicine Daily Progress Note    Date of Service  7/21/2022    Chief Complaint  Bee Sharma is a 72 y.o. female admitted 7/20/2022 with AMS    Hospital Course  Bee Sharma is a 72 y.o. female reported to the state with past medical history of CVA and severely disabled who presented 7/20/2022 with altered mental status.  She was noted to be hypertensive with SBP up to 240s on presentation.  CT head revealed pontine/midbrain/basal ganglia hemorrhage with associated intraventricular hemorrhage.  ERP disussed with patient's legal guardian and she was transitioned to comfort care.    Interval Problem Update  No acute events overnight.  Continue comfort care measures.  Anticipate patient passing away later today.    I have discussed this patient's plan of care and discharge plan at IDT rounds today with Case Management, Nursing, Nursing leadership, and other members of the IDT team.    Consultants/Specialty  none    Code Status  Comfort Care/DNR    Disposition  Patient is not medically cleared for discharge.   Anticipate patient to pass away in the hospital.    Review of Systems  Review of Systems   Unable to perform ROS: Acuity of condition        Physical Exam  Temp:  [36 °C (96.8 °F)-37.1 °C (98.7 °F)] 37.1 °C (98.7 °F)  Pulse:  [] 106  Resp:  [3-28] 3  BP: (109-240)/() 135/62  SpO2:  [8 %-97 %] 8 %    Physical Exam    Fluids  No intake or output data in the 24 hours ending 07/21/22 1015    Laboratory  Recent Labs     07/20/22  0925   WBC 13.2*   RBC 5.41*   HEMOGLOBIN 15.7   HEMATOCRIT 48.8*   MCV 90.2   MCH 29.0   MCHC 32.2*   RDW 45.6   PLATELETCT 273   MPV 10.5     Recent Labs     07/20/22  0925   SODIUM 141   POTASSIUM 3.9   CHLORIDE 105   CO2 20   GLUCOSE 154*   BUN 16   CREATININE 0.69   CALCIUM 9.4     Recent Labs     07/20/22  0925   APTT 28.6   INR 1.04               Imaging  CT-CTA NECK WITH & W/O-POST PROCESSING   Final Result      1.  At the level of the left carotid bifurcation there  is an 11 x 8 mm area of abnormal contrast opacification medial to the internal carotid artery which may represent a pseudoaneurysm. Carotid duplex ultrasound could be performed for further    evaluation.   2.  Atherosclerosis.      Findings were discussed with Dr. Alford on 7/20/2022 at 1136 hours.      CT-CTA HEAD WITH & W/O-POST PROCESS   Final Result      CT angiogram of the [place] within normal limits.      CT-HEAD W/O   Final Result      1.  2.5 x 3.0 cm right basal ganglia and parenchymal hematoma which extends into the ventricular system resulting in a small amount of dependent hemorrhage in the left lateral ventricle and within the third ventricle.   2.  Minimal right to left midline shift measuring 2 mm.   3.  Advanced diffuse cerebral substance loss.   4.  Advanced microangiopathic ischemic change.      Findings were discussed with Dr. lAford on 7/20/2022 0945 hours.           Assessment/Plan  * Intracranial hemorrhage (HCC)- (present on admission)  Assessment & Plan  Noted on CT imaging  Pt made comfort care    Hypertensive emergency  Assessment & Plan  With resultant ICH  Now on comfort care         VTE prophylaxis: comfort care    I have performed a physical exam and reviewed and updated ROS and Plan today (7/21/2022). In review of yesterday's note (7/20/2022), there are no changes except as documented above.

## 2022-07-22 NOTE — PROGRESS NOTES
Hospital Medicine Daily Progress Note    Date of Service  7/22/2022    Chief Complaint  Bee Sharma is a 72 y.o. female admitted 7/20/2022 with AMS    Hospital Course  Bee Sharma is a 72 y.o. female reported to the state with past medical history of CVA and severely disabled who presented 7/20/2022 with altered mental status.  She was noted to be hypertensive with SBP up to 240s on presentation.  CT head revealed pontine/midbrain/basal ganglia hemorrhage with associated intraventricular hemorrhage.  ERP disussed with patient's legal guardian and she was transitioned to comfort care.    Interval Problem Update  No acute events overnight.  Continue comfort care measures.  Anticipate patient passing away in next 24 hours.    I have discussed this patient's plan of care and discharge plan at IDT rounds today with Case Management, Nursing, Nursing leadership, and other members of the IDT team.    Consultants/Specialty  none    Code Status  Comfort Care/DNR    Disposition  Patient is not medically cleared for discharge.   Anticipate patient to pass away in the hospital.    Review of Systems  Review of Systems   Unable to perform ROS: Acuity of condition        Physical Exam  Temp:  [36.3 °C (97.3 °F)-36.3 °C (97.4 °F)] 36.3 °C (97.3 °F)  Pulse:  [] 116  Resp:  [6] 6  BP: (195-212)/(87-88) 212/88  SpO2:  [52 %-75 %] 52 %    Physical Exam    Fluids  No intake or output data in the 24 hours ending 07/22/22 0950    Laboratory  Recent Labs     07/20/22  0925   WBC 13.2*   RBC 5.41*   HEMOGLOBIN 15.7   HEMATOCRIT 48.8*   MCV 90.2   MCH 29.0   MCHC 32.2*   RDW 45.6   PLATELETCT 273   MPV 10.5     Recent Labs     07/20/22  0925   SODIUM 141   POTASSIUM 3.9   CHLORIDE 105   CO2 20   GLUCOSE 154*   BUN 16   CREATININE 0.69   CALCIUM 9.4     Recent Labs     07/20/22  0925   APTT 28.6   INR 1.04               Imaging  CT-CTA NECK WITH & W/O-POST PROCESSING   Final Result      1.  At the level of the left carotid bifurcation  there is an 11 x 8 mm area of abnormal contrast opacification medial to the internal carotid artery which may represent a pseudoaneurysm. Carotid duplex ultrasound could be performed for further    evaluation.   2.  Atherosclerosis.      Findings were discussed with Dr. Alford on 7/20/2022 at 1136 hours.      CT-CTA HEAD WITH & W/O-POST PROCESS   Final Result      CT angiogram of the [place] within normal limits.      CT-HEAD W/O   Final Result      1.  2.5 x 3.0 cm right basal ganglia and parenchymal hematoma which extends into the ventricular system resulting in a small amount of dependent hemorrhage in the left lateral ventricle and within the third ventricle.   2.  Minimal right to left midline shift measuring 2 mm.   3.  Advanced diffuse cerebral substance loss.   4.  Advanced microangiopathic ischemic change.      Findings were discussed with Dr. Alford on 7/20/2022 0945 hours.           Assessment/Plan  * Intracranial hemorrhage (HCC)- (present on admission)  Assessment & Plan  Noted on CT imaging  Pt made comfort care    Hypertensive emergency  Assessment & Plan  With resultant ICH  Now on comfort care       VTE prophylaxis: comfort care    I have performed a physical exam and reviewed and updated ROS and Plan today (7/22/2022). In review of yesterday's note (7/21/2022), there are no changes except as documented above.

## 2022-07-22 NOTE — CARE PLAN
The patient is Stable - Low risk of patient condition declining or worsening    Shift Goals  Clinical Goals: comfort care  Patient Goals: comfort care    Progress made toward(s) clinical / shift goals:      Problem: Pain - Standard  Goal: Alleviation of pain or a reduction in pain to the patient’s comfort goal  Outcome: Progressing     Problem: Skin Integrity  Goal: Skin integrity is maintained or improved  Outcome: Progressing     Problem: Fall Risk  Goal: Patient will remain free from falls  Outcome: Progressing       Patient is not progressing towards the following goals:

## 2022-07-23 NOTE — PROGRESS NOTES
1900 Report received from off-going RN. Pt Comfort Care. Pt with eyes closed in bed, does not appear to be in pain or distress. No copious secretions noted. Pt repositioned.   2000 Pt in bed with eyes closed, no distress noted.

## 2022-07-23 NOTE — PROGRESS NOTES
Hospital Medicine Daily Progress Note    Date of Service  7/23/2022    Chief Complaint  Bee Sharma is a 72 y.o. female admitted 7/20/2022 with AMS    Hospital Course  Bee Sharma is a 72 y.o. female reported to the state with past medical history of CVA and severely disabled who presented 7/20/2022 with altered mental status.  She was noted to be hypertensive with SBP up to 240s on presentation.  CT head revealed pontine/midbrain/basal ganglia hemorrhage with associated intraventricular hemorrhage.  ERP disussed with patient's legal guardian and she was transitioned to comfort care.    Interval Problem Update  No acute events overnight.  Patient unresponsive at bedside, she appears comfortable.  She remains hypoxic at every interval vital signs check.  Anticipate patient passing away in next 24 hours.    I have discussed this patient's plan of care and discharge plan at IDT rounds today with Case Management, Nursing, Nursing leadership, and other members of the IDT team.    Consultants/Specialty  none    Code Status  Comfort Care/DNR    Disposition  Patient is not medically cleared for discharge.   Anticipate patient to pass away in the hospital.    Review of Systems  Review of Systems   Unable to perform ROS: Acuity of condition        Physical Exam  Temp:  [36.1 °C (97 °F)] 36.1 °C (97 °F)  Pulse:  [113] 113  Resp:  [10] 10  BP: (132)/(58) 132/58  SpO2:  [55 %] 55 %    Physical Exam    Fluids  No intake or output data in the 24 hours ending 07/23/22 0925    Laboratory                        Imaging  CT-CTA NECK WITH & W/O-POST PROCESSING   Final Result      1.  At the level of the left carotid bifurcation there is an 11 x 8 mm area of abnormal contrast opacification medial to the internal carotid artery which may represent a pseudoaneurysm. Carotid duplex ultrasound could be performed for further    evaluation.   2.  Atherosclerosis.      Findings were discussed with Dr. Alford on 7/20/2022 at 1136 hours.       CT-CTA HEAD WITH & W/O-POST PROCESS   Final Result      CT angiogram of the [place] within normal limits.      CT-HEAD W/O   Final Result      1.  2.5 x 3.0 cm right basal ganglia and parenchymal hematoma which extends into the ventricular system resulting in a small amount of dependent hemorrhage in the left lateral ventricle and within the third ventricle.   2.  Minimal right to left midline shift measuring 2 mm.   3.  Advanced diffuse cerebral substance loss.   4.  Advanced microangiopathic ischemic change.      Findings were discussed with Dr. Alford on 7/20/2022 0945 hours.           Assessment/Plan  * Intracranial hemorrhage (HCC)- (present on admission)  Assessment & Plan  Noted on CT imaging  Pt made comfort care    Hypertensive emergency  Assessment & Plan  With resultant ICH  Now on comfort care       VTE prophylaxis: comfort care    I have performed a physical exam and reviewed and updated ROS and Plan today (7/23/2022). In review of yesterday's note (7/22/2022), there are no changes except as documented above.

## 2022-07-23 NOTE — CARE PLAN
Problem: Pain - Standard  Goal: Alleviation of pain or a reduction in pain to the patient’s comfort goal  Outcome: Progressing   The patient is Unstable - High likelihood or risk of patient condition declining or worsening    Shift Goals  Clinical Goals: remain comfortably  Patient Goals: SUZANNA  Family Goals: n/a    Progress made toward(s) clinical / shift goals:  yes Pt remains calm and comfortably  Patient is not progressing towards the following goals:

## 2022-07-24 NOTE — CARE PLAN
The patient is Unstable - High likelihood or risk of patient condition declining or worsening    Shift Goals  Clinical Goals: comfort care  Patient Goals: SUZANNA  Family Goals: n/a    Progress made toward(s) clinical / shift goals:  Pt is comfort care    Patient is not progressing towards the following goals:

## 2022-07-24 NOTE — CARE PLAN
The patient is Unstable - High likelihood or risk of patient condition declining or worsening    Shift Goals  Clinical Goals: Comfort care  Patient Goals: SUZANNA  Family Goals: n/a    Progress made toward(s) clinical / shift goals:  Patient appeared to rest comfortably today. Medicated once with IV morphine per MAR. Pt bathed, turned for comfort.     Patient is not progressing towards the following goals:

## 2022-07-24 NOTE — PROGRESS NOTES
Assumed care of pt pt in bed resting. Respirations irregular & shallow. No apparent signs of distress or discomfort. Pt is comfort care

## 2022-07-24 NOTE — PROGRESS NOTES
Hospital Medicine Daily Progress Note    Date of Service  7/24/2022    Chief Complaint  Bee Sharma is a 72 y.o. female admitted 7/20/2022 with AMS    Hospital Course  Bee Sharma is a 72 y.o. female reported to the state with past medical history of CVA and severely disabled who presented 7/20/2022 with altered mental status.  She was noted to be hypertensive with SBP up to 240s on presentation.  CT head revealed pontine/midbrain/basal ganglia hemorrhage with associated intraventricular hemorrhage.  ERP disussed with patient's legal guardian and she was transitioned to comfort care.    Interval Problem Update  No acute events overnight.  Continues to be hypoxic and tachycardic during interval vital signs checks.  Anticipate patient passing away soon. Continue comfort care measures.    I have discussed this patient's plan of care and discharge plan at IDT rounds today with Case Management, Nursing, Nursing leadership, and other members of the IDT team.    Consultants/Specialty  none    Code Status  Comfort Care/DNR    Disposition  Patient is not medically cleared for discharge.   Anticipate patient to pass away in the hospital.    Review of Systems  Review of Systems   Unable to perform ROS: Acuity of condition        Physical Exam  Temp:  [37.7 °C (99.8 °F)] 37.7 °C (99.8 °F)  Pulse:  [109-118] 118  Resp:  [8] 8  BP: (131)/(62) 131/62  SpO2:  [46 %-65 %] 65 %    Physical Exam    Fluids  No intake or output data in the 24 hours ending 07/24/22 1355    Laboratory                        Imaging  CT-CTA NECK WITH & W/O-POST PROCESSING   Final Result      1.  At the level of the left carotid bifurcation there is an 11 x 8 mm area of abnormal contrast opacification medial to the internal carotid artery which may represent a pseudoaneurysm. Carotid duplex ultrasound could be performed for further    evaluation.   2.  Atherosclerosis.      Findings were discussed with Dr. Alford on 7/20/2022 at 1136 hours.      CT-CTA HEAD  WITH & W/O-POST PROCESS   Final Result      CT angiogram of the [place] within normal limits.      CT-HEAD W/O   Final Result      1.  2.5 x 3.0 cm right basal ganglia and parenchymal hematoma which extends into the ventricular system resulting in a small amount of dependent hemorrhage in the left lateral ventricle and within the third ventricle.   2.  Minimal right to left midline shift measuring 2 mm.   3.  Advanced diffuse cerebral substance loss.   4.  Advanced microangiopathic ischemic change.      Findings were discussed with Dr. Alford on 7/20/2022 0945 hours.           Assessment/Plan  * Intracranial hemorrhage (HCC)- (present on admission)  Assessment & Plan  Noted on CT imaging  Pt made comfort care    Hypertensive emergency  Assessment & Plan  With resultant ICH  Now on comfort care       VTE prophylaxis: comfort care    I have performed a physical exam and reviewed and updated ROS and Plan today (7/24/2022). In review of yesterday's note (7/23/2022), there are no changes except as documented above.

## 2022-07-25 NOTE — PROGRESS NOTES
Received patient ,pronounced with primary RN, called and not 's case,informed tissue Donor and pt not a candidate for tissue donor,LDA removed,body cleaned and placed in body bag,state Guardian Vira Sierra informed.Nursing Adminstrator/MD Dr.Robert Mcintyre&Charge RN was informed.Pt only had blanket at bedside,no safe keeping list found on chart,will sent [place] to security.

## 2022-07-25 NOTE — DISCHARGE SUMMARY
Death Summary    Cause of Death  Cardiac arrest due to respiratory failure due to basal ganglia stroke and hemorrhage with intraventricular hemorrhage.    Comorbid Conditions at the Time of Death  Principal Problem:    Intracranial hemorrhage (HCC) POA: Yes  Active Problems:    Hypertensive emergency POA: Unknown  Resolved Problems:    * No resolved hospital problems. *      History of Presenting Illness and Hospital Course  This is a 72 y.o. female admitted 7/20/2022 with altered mental status. Patient with history of stroke with left sided deficits, severe disability, non verbal at baseline, who presented with altered mental status and unilateral facial droop. Patient found to have acute 2.5 x 3 cm right basal ganglia and parenchymal hemorrhage extending into ventricles on CT scan, as well as findings of advanced diffuse cerebral substance loss and microangiopathic ischemic changes. Patient with hypertensive emergency on presentation, likely hypertensive etiology of patient's stroke and hemorrhage. Emergency department physician discussed findings with patient's legal guardian and patient was transitioned to comfort care. Patient treated with comfort care measures, she passed peacefully 7/25/22.    Death Date: 07/25/22   Death Time: 0659         Pronounced By (RN1): Grace HELM RN  Pronounced By (RN2): ZACHARY Ann

## 2022-07-25 NOTE — PROGRESS NOTES
Report received.  Assumed Care.  Pt in bed, resting, no apparent s/s of distress. Pt is comfort care